# Patient Record
Sex: MALE | Race: WHITE | NOT HISPANIC OR LATINO | Employment: OTHER | URBAN - METROPOLITAN AREA
[De-identification: names, ages, dates, MRNs, and addresses within clinical notes are randomized per-mention and may not be internally consistent; named-entity substitution may affect disease eponyms.]

---

## 2018-04-17 ENCOUNTER — TRANSCRIBE ORDERS (OUTPATIENT)
Dept: ADMINISTRATIVE | Facility: HOSPITAL | Age: 53
End: 2018-04-17

## 2018-04-17 ENCOUNTER — HOSPITAL ENCOUNTER (OUTPATIENT)
Dept: GENERAL RADIOLOGY | Facility: HOSPITAL | Age: 53
Discharge: HOME | End: 2018-04-17
Attending: FAMILY MEDICINE | Admitting: FAMILY MEDICINE

## 2018-04-17 DIAGNOSIS — N20.0 CALCULUS OF KIDNEY: Primary | ICD-10-CM

## 2018-04-17 PROCEDURE — 74018 RADEX ABDOMEN 1 VIEW: CPT

## 2018-10-12 ENCOUNTER — HOSPITAL ENCOUNTER (OUTPATIENT)
Dept: CT IMAGING | Facility: HOSPITAL | Age: 53
Discharge: HOME | End: 2018-10-12
Admitting: FAMILY MEDICINE

## 2018-10-12 ENCOUNTER — TRANSCRIBE ORDERS (OUTPATIENT)
Dept: ADMINISTRATIVE | Facility: HOSPITAL | Age: 53
End: 2018-10-12

## 2018-10-12 DIAGNOSIS — N20.0 KIDNEY STONES: ICD-10-CM

## 2018-10-12 DIAGNOSIS — N20.0 KIDNEY STONES: Primary | ICD-10-CM

## 2018-10-12 PROCEDURE — 74176 CT ABD & PELVIS W/O CONTRAST: CPT

## 2022-04-22 ENCOUNTER — APPOINTMENT (OUTPATIENT)
Dept: GENERAL RADIOLOGY | Facility: HOSPITAL | Age: 57
End: 2022-04-22

## 2022-04-22 ENCOUNTER — HOSPITAL ENCOUNTER (EMERGENCY)
Facility: HOSPITAL | Age: 57
Discharge: HOME | End: 2022-04-22
Attending: EMERGENCY MEDICINE | Admitting: EMERGENCY MEDICINE

## 2022-04-22 VITALS
HEART RATE: 71 BPM | DIASTOLIC BLOOD PRESSURE: 87 MMHG | TEMPERATURE: 98.3 F | BODY MASS INDEX: 24.34 KG/M2 | HEIGHT: 70 IN | OXYGEN SATURATION: 98 % | RESPIRATION RATE: 18 BRPM | WEIGHT: 170 LBS | SYSTOLIC BLOOD PRESSURE: 128 MMHG

## 2022-04-22 DIAGNOSIS — S63.289A DISLOCATION OF PROXIMAL INTERPHALANGEAL JOINT OF FINGER, INITIAL ENCOUNTER: Primary | ICD-10-CM

## 2022-04-22 DIAGNOSIS — S61.217A LACERATION OF LEFT LITTLE FINGER WITHOUT FOREIGN BODY WITHOUT DAMAGE TO NAIL, INITIAL ENCOUNTER: ICD-10-CM

## 2022-04-22 DIAGNOSIS — S62.647B OPEN NONDISPLACED FRACTURE OF PROXIMAL PHALANX OF LEFT LITTLE FINGER, INITIAL ENCOUNTER: ICD-10-CM

## 2022-04-22 PROCEDURE — 73110 X-RAY EXAM OF WRIST: CPT | Performed by: RADIOLOGY

## 2022-04-22 PROCEDURE — 99283 EMERGENCY DEPT VISIT LOW MDM: CPT

## 2022-04-22 PROCEDURE — 73140 X-RAY EXAM OF FINGER(S): CPT

## 2022-04-22 PROCEDURE — 73110 X-RAY EXAM OF WRIST: CPT

## 2022-04-22 PROCEDURE — 90471 IMMUNIZATION ADMIN: CPT | Performed by: PHYSICIAN ASSISTANT

## 2022-04-22 PROCEDURE — 73140 X-RAY EXAM OF FINGER(S): CPT | Performed by: RADIOLOGY

## 2022-04-22 PROCEDURE — 73130 X-RAY EXAM OF HAND: CPT

## 2022-04-22 PROCEDURE — 90715 TDAP VACCINE 7 YRS/> IM: CPT | Performed by: PHYSICIAN ASSISTANT

## 2022-04-22 PROCEDURE — 73130 X-RAY EXAM OF HAND: CPT | Performed by: RADIOLOGY

## 2022-04-22 PROCEDURE — 25010000002 TETANUS-DIPHTH-ACELL PERTUSSIS 5-2.5-18.5 LF-MCG/0.5 SUSPENSION PREFILLED SYRINGE: Performed by: PHYSICIAN ASSISTANT

## 2022-04-22 RX ORDER — CEPHALEXIN 500 MG/1
500 CAPSULE ORAL 4 TIMES DAILY
Qty: 28 CAPSULE | Refills: 0 | Status: SHIPPED | OUTPATIENT
Start: 2022-04-22 | End: 2022-04-29

## 2022-04-22 RX ORDER — LIDOCAINE HYDROCHLORIDE 10 MG/ML
10 INJECTION, SOLUTION EPIDURAL; INFILTRATION; INTRACAUDAL; PERINEURAL ONCE
Status: COMPLETED | OUTPATIENT
Start: 2022-04-22 | End: 2022-04-22

## 2022-04-22 RX ORDER — HYDROCODONE BITARTRATE AND ACETAMINOPHEN 7.5; 325 MG/1; MG/1
1 TABLET ORAL EVERY 6 HOURS PRN
Qty: 8 TABLET | Refills: 0 | Status: SHIPPED | OUTPATIENT
Start: 2022-04-22

## 2022-04-22 RX ORDER — CEPHALEXIN 250 MG/1
500 CAPSULE ORAL ONCE
Status: COMPLETED | OUTPATIENT
Start: 2022-04-22 | End: 2022-04-22

## 2022-04-22 RX ADMIN — TETANUS TOXOID, REDUCED DIPHTHERIA TOXOID AND ACELLULAR PERTUSSIS VACCINE, ADSORBED 0.5 ML: 5; 2.5; 8; 8; 2.5 SUSPENSION INTRAMUSCULAR at 16:14

## 2022-04-22 RX ADMIN — CEPHALEXIN 500 MG: 250 CAPSULE ORAL at 16:13

## 2022-04-22 RX ADMIN — LIDOCAINE HYDROCHLORIDE 10 ML: 10 INJECTION, SOLUTION EPIDURAL; INFILTRATION; INTRACAUDAL; PERINEURAL at 16:13

## 2022-04-22 NOTE — ED NOTES
MEDICAL SCREENING:    Reason for Visit: Left wrist and left fifth finger injury status post rolling over a mower.  Patient does have deformity with a laceration.    Patient initially seen in triage.  The patient was advised further evaluation and diagnostic testing will be needed, some of the treatment and testing will be initiated in the lobby in order to begin the process.  The patient will be returned to the waiting area for the time being and possibly be re-assessed by a subsequent ED provider.  The patient will be brought back to the treatment area in as timely manner as possible.         Tre Rueda PA  04/22/22 1500

## 2022-04-22 NOTE — ED NOTES
Left fifth finger dislocation reduced by OCHOA MORRIS, then irrigated with saline and betadine. Pt tolerated well. Waiting on x-ray prior to closure of laceration on that finger.

## 2022-04-23 NOTE — ED PROVIDER NOTES
Subjective   Patient is a 56-year-old male that presents to the ED following acute injury that occurred just prior to arrival.  He states he was on a 0 turn lawnmower when he got too close to the ditch line and the lawnmower turned over.  He states that he is not sure exactly how he injured the finger in the fall but states that once he got up he noticed a cut along his finger with a gross deformity.  He states he was only able to move it minimally.  He denies hitting his head or loss of consciousness.  He denies chest pain, shortness of breath, fever, chills, nausea, vomiting, headache, dizziness, abdominal pain, dysuria, diarrhea, or constipation.      History provided by:  Patient   used: No        Review of Systems   Constitutional: Negative.  Negative for chills, diaphoresis, fatigue and fever.   HENT: Negative.  Negative for congestion, drooling, ear discharge, ear pain, facial swelling, postnasal drip, rhinorrhea, sinus pressure, sinus pain, sneezing, sore throat, tinnitus and trouble swallowing.    Eyes: Negative.  Negative for photophobia, pain, discharge, redness and itching.   Respiratory: Negative.  Negative for cough, shortness of breath and wheezing.    Cardiovascular: Negative.  Negative for chest pain.   Gastrointestinal: Negative.  Negative for abdominal distention, abdominal pain, blood in stool, constipation, diarrhea, nausea and vomiting.   Endocrine: Negative.    Genitourinary: Negative.  Negative for difficulty urinating, dysuria, flank pain and frequency.   Musculoskeletal: Positive for arthralgias and joint swelling. Negative for back pain, gait problem, myalgias, neck pain and neck stiffness.   Skin: Negative.    Neurological: Negative.  Negative for dizziness, tremors, seizures, syncope, facial asymmetry, speech difficulty, weakness, light-headedness, numbness and headaches.   Psychiatric/Behavioral: Negative.  Negative for confusion.   All other systems reviewed and are  negative.      No past medical history on file.    Allergies   Allergen Reactions   • Milk-Related Compounds GI Intolerance       No past surgical history on file.    No family history on file.    Social History     Socioeconomic History   • Marital status:            Objective   Physical Exam  Vitals and nursing note reviewed.   Constitutional:       General: He is not in acute distress.     Appearance: He is well-developed. He is not diaphoretic.   HENT:      Head: Normocephalic and atraumatic.      Right Ear: External ear normal.      Left Ear: External ear normal.      Nose: Nose normal.      Mouth/Throat:      Mouth: Mucous membranes are moist.      Pharynx: Oropharynx is clear.   Eyes:      Extraocular Movements: Extraocular movements intact.      Conjunctiva/sclera: Conjunctivae normal.      Pupils: Pupils are equal, round, and reactive to light.   Neck:      Vascular: No JVD.      Trachea: No tracheal deviation.   Cardiovascular:      Rate and Rhythm: Normal rate and regular rhythm.      Pulses: Normal pulses.      Heart sounds: Normal heart sounds. No murmur heard.  Pulmonary:      Effort: Pulmonary effort is normal. No respiratory distress.      Breath sounds: Normal breath sounds. No wheezing.   Abdominal:      General: Bowel sounds are normal. There is no distension.      Palpations: Abdomen is soft.      Tenderness: There is no abdominal tenderness. There is no guarding or rebound.   Musculoskeletal:         General: No deformity. Normal range of motion.      Cervical back: Normal range of motion and neck supple.      Comments: Nation of the left index finger reveals a 2 cm laceration just proximal to the PIP joint on the palmar surface.  There is exposed bone.  There is dislocation of the PIP joint.  There is gross deformity.  His neurovascular status is intact.   Skin:     General: Skin is warm and dry.      Coloration: Skin is not pale.      Findings: No erythema or rash.   Neurological:       General: No focal deficit present.      Mental Status: He is alert and oriented to person, place, and time.      Cranial Nerves: No cranial nerve deficit.   Psychiatric:         Mood and Affect: Mood normal.         Behavior: Behavior normal.         Thought Content: Thought content normal.         Laceration Repair    Date/Time: 4/22/2022 10:00 PM  Performed by: Myranda Beebe PA-C  Authorized by: Mau Zendejas MD     Consent:     Consent obtained:  Verbal    Consent given by:  Patient    Risks, benefits, and alternatives were discussed: yes      Risks discussed:  Infection, need for additional repair, nerve damage, pain, poor cosmetic result, poor wound healing, retained foreign body, tendon damage and vascular damage    Alternatives discussed:  No treatment  Universal protocol:     Patient identity confirmed:  Verbally with patient and arm band  Anesthesia:     Anesthesia method:  Nerve block    Block location:  Digital block    Block needle gauge:  25 G    Block anesthetic:  Lidocaine 1% w/o epi    Block injection procedure:  Anatomic landmarks identified    Block outcome:  Anesthesia achieved  Laceration details:     Length (cm):  2    Depth (mm):  2  Pre-procedure details:     Preparation:  Patient was prepped and draped in usual sterile fashion  Exploration:     Hemostasis achieved with:  Direct pressure    Imaging obtained: x-ray      Imaging outcome: foreign body not noted      Wound extent: fascia violated and underlying fracture      Wound extent: no foreign bodies/material noted, no muscle damage noted, no nerve damage noted, no tendon damage noted and no vascular damage noted    Treatment:     Irrigation method:  Syringe    Debridement:  None  Skin repair:     Repair method:  Sutures    Suture size:  4-0    Suture material:  Nylon    Suture technique:  Simple interrupted    Number of sutures:  5  Approximation:     Approximation:  Close  Repair type:     Repair type:  Intermediate  Post-procedure  details:     Dressing:  Non-adherent dressing and splint for protection    Procedure completion:  Tolerated well, no immediate complications  FX Dislocation    Date/Time: 4/22/2022 10:02 PM  Performed by: Myranda Beebe PA-C  Authorized by: Mau Zendejas MD     Consent:     Consent obtained:  Verbal    Consent given by:  Patient    Risks, benefits, and alternatives were discussed: yes      Risks discussed:  Nerve damage, pain and vascular damage    Alternatives discussed:  No treatment  Universal protocol:     Patient identity confirmed:  Verbally with patient and arm band  Injury:     Injury location:  Finger    Finger injury location:  L index finger    Finger fracture type: proximal phalanx fracture      MCP joint involved: no      IP joint involved: yes    Pre-procedure details:     Distal neurologic exam:  Normal    Distal perfusion: distal pulses strong      Range of motion: reduced    Sedation:     Sedation type:  None  Anesthesia:     Anesthesia method:  Nerve block    Block location:  Digital block    Block anesthetic:  Lidocaine 1% w/o epi    Block injection procedure:  Anatomic landmarks identified    Block outcome:  Anesthesia achieved  Procedure details:     Skeletal traction used: yes      Pin inserted: yes      X-ray confirmed reduction: yes                 ED Course  ED Course as of 04/22/22 2209 Fri Apr 22, 2022   0193 Discussed with Dr. Gann.  We reviewed x-rays and he recommends resetting the dislocation.  He then would like the wound to be irrigated well.  He states that the laceration can then be repaired and follow-up with him in the clinic. [MH]      ED Course User Index  [MH] Myranda Beebe PA-C                                                 [facility]    Final diagnoses:   Dislocation of proximal interphalangeal joint of finger, initial encounter   Laceration of left little finger without foreign body without damage to nail, initial encounter   Open nondisplaced fracture of  proximal phalanx of left little finger, initial encounter       ED Disposition  ED Disposition     ED Disposition   Discharge    Condition   Stable    Comment   --             Justin Gann, DO  [address] [place] [id number] [phone]    Schedule an appointment as soon as possible for a visit in 1 day      Leigh Isaacs, VITOR  [address] [facility] [id number] [phone]    Schedule an appointment as soon as possible for a visit in 1 day           Medication List      New Prescriptions    cephalexin 500 MG capsule  Commonly known as: KEFLEX  Take 1 capsule by mouth 4 (Four) Times a Day for 7 days.     HYDROcodone-acetaminophen 7.5-325 MG per tablet  Commonly known as: NORCO  Take 1 tablet by mouth Every 6 (Six) Hours As Needed for Moderate Pain .           Where to Get Your Medications      These medications were sent to [id number] - [place], [place] - [address] [facility] [phone] [facility] [phone]   [address] [address]    Phone: [phone]   · HYDROcodone-acetaminophen 7.5-325 MG per tablet     You can get these medications from any pharmacy    Bring a paper prescription for each of these medications  · cephalexin 500 MG capsule          Myranda Beebe PA-C  04/22/22 7519

## 2022-11-21 ENCOUNTER — OFFICE VISIT (OUTPATIENT)
Dept: SURGERY | Facility: CLINIC | Age: 57
End: 2022-11-21

## 2022-11-21 VITALS
HEIGHT: 71 IN | SYSTOLIC BLOOD PRESSURE: 112 MMHG | WEIGHT: 175 LBS | DIASTOLIC BLOOD PRESSURE: 70 MMHG | BODY MASS INDEX: 24.5 KG/M2

## 2022-11-21 DIAGNOSIS — K43.2 INCISIONAL HERNIA, WITHOUT OBSTRUCTION OR GANGRENE: Primary | ICD-10-CM

## 2022-11-21 PROCEDURE — 99203 OFFICE O/P NEW LOW 30 MIN: CPT | Performed by: SURGERY

## 2022-11-21 RX ORDER — RIZATRIPTAN BENZOATE 10 MG/1
TABLET ORAL
COMMUNITY
Start: 2022-11-19

## 2022-11-21 NOTE — PROGRESS NOTES
Subjective   Jhon Morocho is a 57 y.o. male.     Chief Complaint: incisional hernia    History of Present Illness He is a 56 yo who had a benign tumor near his colon removed over 15 years ago with no sign of recurrence. His last colonoscopy was years ago and was normal. He ad a CT about 8 years ago that was julia. He has had small lumps in the old scar that are tender and come and go. No recent scans or GI issues.     The following portions of the patient's history were reviewed and updated as appropriate: current medications, past family history, past medical history, past social history, past surgical history and problem list.    Review of Systems   Constitutional: Negative for activity change, appetite change, chills, fever and unexpected weight change.   HENT: Negative for congestion, facial swelling and sore throat.    Eyes: Negative for photophobia and visual disturbance.   Respiratory: Negative for chest tightness, shortness of breath and wheezing.    Cardiovascular: Negative for chest pain, palpitations and leg swelling.   Gastrointestinal: Positive for abdominal pain. Negative for abdominal distention, anal bleeding, blood in stool, constipation, diarrhea, nausea, rectal pain and vomiting.   Endocrine: Negative for cold intolerance, heat intolerance, polydipsia and polyuria.   Genitourinary: Negative for difficulty urinating, dysuria, flank pain and urgency.   Musculoskeletal: Negative for back pain and myalgias.   Skin: Negative for rash and wound.   Allergic/Immunologic: Negative for immunocompromised state.   Neurological: Negative for dizziness, seizures, syncope, light-headedness, numbness and headaches.   Hematological: Negative for adenopathy. Does not bruise/bleed easily.   Psychiatric/Behavioral: Negative for behavioral problems and confusion. The patient is not nervous/anxious.        Objective   Physical Exam  Vitals reviewed.   Constitutional:       General: He is not in acute distress.      Appearance: He is well-developed. He is not ill-appearing.       HENT:      Head: Normocephalic. No laceration. Hair is normal.      Right Ear: Hearing and ear canal normal.      Left Ear: Hearing and ear canal normal.      Nose: Nose normal.      Right Sinus: No maxillary sinus tenderness or frontal sinus tenderness.      Left Sinus: No maxillary sinus tenderness or frontal sinus tenderness.   Eyes:      General: Lids are normal.      Conjunctiva/sclera: Conjunctivae normal.      Pupils: Pupils are equal, round, and reactive to light.   Neck:      Thyroid: No thyroid mass or thyromegaly.      Vascular: No JVD.      Trachea: No tracheal tenderness or tracheal deviation.   Cardiovascular:      Rate and Rhythm: Normal rate and regular rhythm.      Heart sounds: No murmur heard.    No gallop.   Pulmonary:      Effort: Pulmonary effort is normal.      Breath sounds: Normal breath sounds. No stridor. No wheezing.   Chest:      Chest wall: No tenderness.   Abdominal:      General: Bowel sounds are normal. There is no distension.      Palpations: Abdomen is soft. There is no mass.      Tenderness: There is abdominal tenderness. There is no guarding or rebound.      Hernia: A hernia is present.   Musculoskeletal:         General: No deformity.      Cervical back: Normal range of motion.   Lymphadenopathy:      Cervical: No cervical adenopathy.      Upper Body:      Right upper body: No supraclavicular adenopathy.      Left upper body: No supraclavicular adenopathy.   Skin:     General: Skin is warm and dry.      Coloration: Skin is not pale.      Findings: No erythema or rash.   Neurological:      Mental Status: He is alert and oriented to person, place, and time.      Motor: No abnormal muscle tone.   Psychiatric:         Behavior: Behavior normal.         Thought Content: Thought content normal.         History reviewed. No pertinent past medical history.    History reviewed. No pertinent family history.    Social History      Tobacco Use   • Smoking status: Never   • Smokeless tobacco: Never   Vaping Use   • Vaping Use: Never used   Substance Use Topics   • Alcohol use: Never   • Drug use: Never       Past Surgical History:   Procedure Laterality Date   • COLON SURGERY     • COLONOSCOPY     • KIDNEY STONE SURGERY     • VASECTOMY         Current Outpatient Medications   Medication Instructions   • HYDROcodone-acetaminophen (NORCO) 7.5-325 MG per tablet 1 tablet, Oral, Every 6 Hours PRN   • rizatriptan (MAXALT) 10 MG tablet TAKE 1 TABLET BY MOUTH MAX DOSE PER DAY 30MG/24HRS         Assessment & Plan   Diagnoses and all orders for this visit:    1. Incisional hernia, without obstruction or gangrene (Primary)    He will get a CT first and then repair the hernia

## 2022-11-28 ENCOUNTER — TELEPHONE (OUTPATIENT)
Dept: SURGERY | Facility: CLINIC | Age: 57
End: 2022-11-28

## 2022-11-30 ENCOUNTER — HOSPITAL ENCOUNTER (OUTPATIENT)
Dept: CT IMAGING | Facility: HOSPITAL | Age: 57
Discharge: HOME | End: 2022-11-30
Admitting: SURGERY

## 2022-11-30 DIAGNOSIS — K43.2 INCISIONAL HERNIA, WITHOUT OBSTRUCTION OR GANGRENE: ICD-10-CM

## 2022-11-30 LAB — CREAT BLDA-MCNC: 1.4 MG/DL (ref 0.6–1.3)

## 2022-11-30 PROCEDURE — 74177 CT ABD & PELVIS W/CONTRAST: CPT | Performed by: RADIOLOGY

## 2022-11-30 PROCEDURE — 82565 ASSAY OF CREATININE: CPT

## 2022-11-30 PROCEDURE — 25010000002 IOPAMIDOL 61 % SOLUTION: Performed by: SURGERY

## 2022-11-30 PROCEDURE — 74177 CT ABD & PELVIS W/CONTRAST: CPT

## 2022-11-30 RX ADMIN — IOPAMIDOL 80 ML: 612 INJECTION, SOLUTION INTRAVENOUS at 13:26

## 2022-12-02 ENCOUNTER — TELEPHONE (OUTPATIENT)
Dept: SURGERY | Facility: CLINIC | Age: 57
End: 2022-12-02

## 2022-12-06 ENCOUNTER — PRE-ADMISSION TESTING (OUTPATIENT)
Dept: PREADMISSION TESTING | Facility: HOSPITAL | Age: 57
End: 2022-12-06

## 2022-12-06 NOTE — DISCHARGE INSTRUCTIONS

## 2022-12-08 ENCOUNTER — ANESTHESIA (OUTPATIENT)
Dept: PERIOP | Facility: HOSPITAL | Age: 57
End: 2022-12-08

## 2022-12-08 ENCOUNTER — HOSPITAL ENCOUNTER (OUTPATIENT)
Facility: HOSPITAL | Age: 57
Setting detail: HOSPITAL OUTPATIENT SURGERY
Discharge: HOME | End: 2022-12-08
Attending: SURGERY | Admitting: SURGERY

## 2022-12-08 ENCOUNTER — ANESTHESIA EVENT (OUTPATIENT)
Dept: PERIOP | Facility: HOSPITAL | Age: 57
End: 2022-12-08

## 2022-12-08 VITALS
HEART RATE: 79 BPM | OXYGEN SATURATION: 98 % | BODY MASS INDEX: 24.5 KG/M2 | RESPIRATION RATE: 16 BRPM | SYSTOLIC BLOOD PRESSURE: 121 MMHG | HEIGHT: 71 IN | WEIGHT: 175 LBS | TEMPERATURE: 97.5 F | DIASTOLIC BLOOD PRESSURE: 79 MMHG

## 2022-12-08 DIAGNOSIS — K43.2 INCISIONAL HERNIA, WITHOUT OBSTRUCTION OR GANGRENE: Primary | ICD-10-CM

## 2022-12-08 PROCEDURE — 25010000002 PROPOFOL 10 MG/ML EMULSION: Performed by: NURSE ANESTHETIST, CERTIFIED REGISTERED

## 2022-12-08 PROCEDURE — 25010000002 FENTANYL CITRATE (PF) 50 MCG/ML SOLUTION: Performed by: NURSE ANESTHETIST, CERTIFIED REGISTERED

## 2022-12-08 PROCEDURE — C1781 MESH (IMPLANTABLE): HCPCS | Performed by: SURGERY

## 2022-12-08 PROCEDURE — 49560 PR REPAIR INCISIONAL HERNIA,REDUCIBLE: CPT | Performed by: SURGERY

## 2022-12-08 PROCEDURE — 25010000002 ONDANSETRON PER 1 MG: Performed by: NURSE ANESTHETIST, CERTIFIED REGISTERED

## 2022-12-08 PROCEDURE — 0 MEPERIDINE PER 100 MG: Performed by: NURSE ANESTHETIST, CERTIFIED REGISTERED

## 2022-12-08 PROCEDURE — 25010000002 NEOSTIGMINE 10 MG/10ML SOLUTION: Performed by: NURSE ANESTHETIST, CERTIFIED REGISTERED

## 2022-12-08 PROCEDURE — 25010000002 MIDAZOLAM PER 1 MG: Performed by: NURSE ANESTHETIST, CERTIFIED REGISTERED

## 2022-12-08 DEVICE — OPTIFIX AT ABSORBABLE FIXATION SYSTEM WITH ARTICULATING TECHNOLOGY - 30 ABSORBABLE FASTENERS
Type: IMPLANTABLE DEVICE | Site: ABDOMEN | Status: FUNCTIONAL
Brand: OPTIFIX AT ABSORBABLE FIXATION SYSTEM WITH ARTICULATING TECHNOLOGY

## 2022-12-08 DEVICE — VENTRIO ST HERNIA PATCH
Type: IMPLANTABLE DEVICE | Site: ABDOMEN | Status: FUNCTIONAL
Brand: VENTRIO ST HERNIA PATCH

## 2022-12-08 RX ORDER — SODIUM CHLORIDE 9 MG/ML
40 INJECTION, SOLUTION INTRAVENOUS AS NEEDED
Status: DISCONTINUED | OUTPATIENT
Start: 2022-12-08 | End: 2022-12-08 | Stop reason: HOSPADM

## 2022-12-08 RX ORDER — EPHEDRINE SULFATE 5 MG/ML
INJECTION INTRAVENOUS AS NEEDED
Status: DISCONTINUED | OUTPATIENT
Start: 2022-12-08 | End: 2022-12-08 | Stop reason: SURG

## 2022-12-08 RX ORDER — MAGNESIUM HYDROXIDE 1200 MG/15ML
LIQUID ORAL AS NEEDED
Status: DISCONTINUED | OUTPATIENT
Start: 2022-12-08 | End: 2022-12-08 | Stop reason: HOSPADM

## 2022-12-08 RX ORDER — SODIUM CHLORIDE 0.9 % (FLUSH) 0.9 %
10 SYRINGE (ML) INJECTION EVERY 12 HOURS SCHEDULED
Status: DISCONTINUED | OUTPATIENT
Start: 2022-12-08 | End: 2022-12-08 | Stop reason: HOSPADM

## 2022-12-08 RX ORDER — NEOSTIGMINE METHYLSULFATE 1 MG/ML
INJECTION, SOLUTION INTRAVENOUS AS NEEDED
Status: DISCONTINUED | OUTPATIENT
Start: 2022-12-08 | End: 2022-12-08 | Stop reason: SURG

## 2022-12-08 RX ORDER — ONDANSETRON 2 MG/ML
4 INJECTION INTRAMUSCULAR; INTRAVENOUS AS NEEDED
Status: DISCONTINUED | OUTPATIENT
Start: 2022-12-08 | End: 2022-12-08 | Stop reason: HOSPADM

## 2022-12-08 RX ORDER — ROCURONIUM BROMIDE 10 MG/ML
INJECTION, SOLUTION INTRAVENOUS AS NEEDED
Status: DISCONTINUED | OUTPATIENT
Start: 2022-12-08 | End: 2022-12-08 | Stop reason: SURG

## 2022-12-08 RX ORDER — IPRATROPIUM BROMIDE AND ALBUTEROL SULFATE 2.5; .5 MG/3ML; MG/3ML
3 SOLUTION RESPIRATORY (INHALATION) ONCE AS NEEDED
Status: DISCONTINUED | OUTPATIENT
Start: 2022-12-08 | End: 2022-12-08 | Stop reason: HOSPADM

## 2022-12-08 RX ORDER — ONDANSETRON 2 MG/ML
INJECTION INTRAMUSCULAR; INTRAVENOUS AS NEEDED
Status: DISCONTINUED | OUTPATIENT
Start: 2022-12-08 | End: 2022-12-08 | Stop reason: SURG

## 2022-12-08 RX ORDER — GLYCOPYRROLATE 0.2 MG/ML
INJECTION INTRAMUSCULAR; INTRAVENOUS AS NEEDED
Status: DISCONTINUED | OUTPATIENT
Start: 2022-12-08 | End: 2022-12-08 | Stop reason: SURG

## 2022-12-08 RX ORDER — HYDROCODONE BITARTRATE AND ACETAMINOPHEN 7.5; 325 MG/1; MG/1
1 TABLET ORAL EVERY 4 HOURS PRN
Status: DISCONTINUED | OUTPATIENT
Start: 2022-12-08 | End: 2022-12-08 | Stop reason: HOSPADM

## 2022-12-08 RX ORDER — PROPOFOL 10 MG/ML
VIAL (ML) INTRAVENOUS AS NEEDED
Status: DISCONTINUED | OUTPATIENT
Start: 2022-12-08 | End: 2022-12-08 | Stop reason: SURG

## 2022-12-08 RX ORDER — SODIUM CHLORIDE, SODIUM LACTATE, POTASSIUM CHLORIDE, CALCIUM CHLORIDE 600; 310; 30; 20 MG/100ML; MG/100ML; MG/100ML; MG/100ML
100 INJECTION, SOLUTION INTRAVENOUS ONCE AS NEEDED
Status: DISCONTINUED | OUTPATIENT
Start: 2022-12-08 | End: 2022-12-08 | Stop reason: HOSPADM

## 2022-12-08 RX ORDER — FENTANYL CITRATE 50 UG/ML
50 INJECTION, SOLUTION INTRAMUSCULAR; INTRAVENOUS
Status: DISCONTINUED | OUTPATIENT
Start: 2022-12-08 | End: 2022-12-08 | Stop reason: HOSPADM

## 2022-12-08 RX ORDER — MIDAZOLAM HYDROCHLORIDE 1 MG/ML
1 INJECTION INTRAMUSCULAR; INTRAVENOUS
Status: DISCONTINUED | OUTPATIENT
Start: 2022-12-08 | End: 2022-12-08 | Stop reason: HOSPADM

## 2022-12-08 RX ORDER — FENTANYL CITRATE 50 UG/ML
INJECTION, SOLUTION INTRAMUSCULAR; INTRAVENOUS AS NEEDED
Status: DISCONTINUED | OUTPATIENT
Start: 2022-12-08 | End: 2022-12-08 | Stop reason: SURG

## 2022-12-08 RX ORDER — MIDAZOLAM HYDROCHLORIDE 1 MG/ML
INJECTION INTRAMUSCULAR; INTRAVENOUS AS NEEDED
Status: DISCONTINUED | OUTPATIENT
Start: 2022-12-08 | End: 2022-12-08 | Stop reason: SURG

## 2022-12-08 RX ORDER — OXYCODONE HYDROCHLORIDE AND ACETAMINOPHEN 5; 325 MG/1; MG/1
1 TABLET ORAL ONCE AS NEEDED
Status: DISCONTINUED | OUTPATIENT
Start: 2022-12-08 | End: 2022-12-08 | Stop reason: HOSPADM

## 2022-12-08 RX ORDER — SODIUM CHLORIDE 0.9 % (FLUSH) 0.9 %
10 SYRINGE (ML) INJECTION AS NEEDED
Status: DISCONTINUED | OUTPATIENT
Start: 2022-12-08 | End: 2022-12-08 | Stop reason: HOSPADM

## 2022-12-08 RX ORDER — FAMOTIDINE 10 MG/ML
INJECTION, SOLUTION INTRAVENOUS AS NEEDED
Status: DISCONTINUED | OUTPATIENT
Start: 2022-12-08 | End: 2022-12-08 | Stop reason: SURG

## 2022-12-08 RX ORDER — BUPIVACAINE HYDROCHLORIDE AND EPINEPHRINE 5; 5 MG/ML; UG/ML
INJECTION, SOLUTION PERINEURAL AS NEEDED
Status: DISCONTINUED | OUTPATIENT
Start: 2022-12-08 | End: 2022-12-08 | Stop reason: HOSPADM

## 2022-12-08 RX ORDER — HYDROCODONE BITARTRATE AND ACETAMINOPHEN 7.5; 325 MG/1; MG/1
1 TABLET ORAL EVERY 4 HOURS PRN
Qty: 15 TABLET | Refills: 0 | Status: SHIPPED | OUTPATIENT
Start: 2022-12-08 | End: 2022-12-18

## 2022-12-08 RX ORDER — SODIUM CHLORIDE, SODIUM LACTATE, POTASSIUM CHLORIDE, CALCIUM CHLORIDE 600; 310; 30; 20 MG/100ML; MG/100ML; MG/100ML; MG/100ML
125 INJECTION, SOLUTION INTRAVENOUS ONCE
Status: COMPLETED | OUTPATIENT
Start: 2022-12-08 | End: 2022-12-08

## 2022-12-08 RX ORDER — MEPERIDINE HYDROCHLORIDE 25 MG/ML
12.5 INJECTION INTRAMUSCULAR; INTRAVENOUS; SUBCUTANEOUS
Status: COMPLETED | OUTPATIENT
Start: 2022-12-08 | End: 2022-12-08

## 2022-12-08 RX ADMIN — FAMOTIDINE 20 MG: 10 INJECTION, SOLUTION INTRAVENOUS at 10:28

## 2022-12-08 RX ADMIN — MEPERIDINE HYDROCHLORIDE 12.5 MG: 25 INJECTION INTRAMUSCULAR; INTRAVENOUS; SUBCUTANEOUS at 11:36

## 2022-12-08 RX ADMIN — SODIUM CHLORIDE, POTASSIUM CHLORIDE, SODIUM LACTATE AND CALCIUM CHLORIDE: 600; 310; 30; 20 INJECTION, SOLUTION INTRAVENOUS at 10:00

## 2022-12-08 RX ADMIN — ONDANSETRON 4 MG: 2 INJECTION INTRAMUSCULAR; INTRAVENOUS at 10:28

## 2022-12-08 RX ADMIN — GLYCOPYRROLATE 0.4 MG: 0.4 INJECTION INTRAMUSCULAR; INTRAVENOUS at 10:24

## 2022-12-08 RX ADMIN — ROCURONIUM BROMIDE 25 MG: 10 INJECTION, SOLUTION INTRAVENOUS at 10:32

## 2022-12-08 RX ADMIN — ONDANSETRON 4 MG: 2 INJECTION INTRAMUSCULAR; INTRAVENOUS at 11:36

## 2022-12-08 RX ADMIN — EPHEDRINE SULFATE 10 MG: 5 INJECTION INTRAVENOUS at 10:50

## 2022-12-08 RX ADMIN — MEPERIDINE HYDROCHLORIDE 12.5 MG: 25 INJECTION INTRAMUSCULAR; INTRAVENOUS; SUBCUTANEOUS at 11:48

## 2022-12-08 RX ADMIN — MIDAZOLAM 2 MG: 1 INJECTION INTRAMUSCULAR; INTRAVENOUS at 10:28

## 2022-12-08 RX ADMIN — FENTANYL CITRATE 50 MCG: 50 INJECTION, SOLUTION INTRAMUSCULAR; INTRAVENOUS at 11:36

## 2022-12-08 RX ADMIN — NEOSTIGMINE METHYLSULFATE 3 MG: 0.5 INJECTION INTRAVENOUS at 10:24

## 2022-12-08 RX ADMIN — PROPOFOL 150 MG: 10 INJECTION, EMULSION INTRAVENOUS at 10:32

## 2022-12-08 RX ADMIN — FENTANYL CITRATE 100 MCG: 50 INJECTION, SOLUTION INTRAMUSCULAR; INTRAVENOUS at 10:28

## 2022-12-08 RX ADMIN — OXYCODONE HYDROCHLORIDE AND ACETAMINOPHEN 1 TABLET: 5; 325 TABLET ORAL at 11:54

## 2022-12-08 NOTE — ANESTHESIA PROCEDURE NOTES
Airway  Urgency: elective    Date/Time: 12/8/2022 10:32 AM  End Time:12/8/2022 10:32 AM  Airway not difficult    General Information and Staff    Patient location during procedure: OR  CRNA/CAA: Darian Velasco CRNA    Indications and Patient Condition  Indications for airway management: airway protection    Preoxygenated: yes  MILS maintained throughout  Mask difficulty assessment: 0 - not attempted    Final Airway Details  Final airway type: endotracheal airway      Successful airway: ETT  Cuffed: yes   Successful intubation technique: direct laryngoscopy  Endotracheal tube insertion site: oral  Blade: Angie  Blade size: 4  ETT size (mm): 7.5  Cormack-Lehane Classification: grade IIa - partial view of glottis  Placement verified by: chest auscultation, capnometry and palpation of cuff   Cuff volume (mL): 8  Measured from: lips  ETT/EBT  to lips (cm): 22  Number of attempts at approach: 1  Assessment: lips, teeth, and gum same as pre-op and atraumatic intubation

## 2022-12-08 NOTE — OP NOTE
VENTRAL/INCISIONAL HERNIA REPAIR  Procedure Note    Jhon MÓNICA Bailee  12/8/2022    Pre-op Diagnosis:   Incisional hernia, without obstruction or gangrene [K43.2]    Post-op Diagnosis: same         Procedure(s):  VENTRAL/INCISIONAL HERNIA REPAIR    Surgeon(s):  Ellis Aguilera MD    Anesthesia: Anesthesia type not filed in the log.    Staff:   Circulator: Lyssa Rojo RN  Scrub Person: Blanca Scott  Assistant: Ellis Armijo    Estimated Blood Loss: minimal    Specimens:                * No orders in the log *      Drains: * No LDAs found *    Procedure: The abdomen was prepped and draped. The old scar was partially excised and the fascia opened with cautery. There were several small separate fascial defects along the scar. The omentum was freed from the abdominal wall and a large oval Bard patch placed in the abdomen. It was tacked to the fascia with tacks and then the fascia was closed in the midline closing all the small defects using vicryl sutures. The subcutaneous tissues were irrigated out. The subcutaneous tissue was closed with vicryl and skin closed with staples.     Findings: incisional hernia         Complications: none   Grafts / Implants N/A    Ellis Aguilera MD     Date: 12/8/2022  Time: 11:12 EST

## 2022-12-08 NOTE — ANESTHESIA PREPROCEDURE EVALUATION
Anesthesia Evaluation     no history of anesthetic complications:  NPO Solid Status: > 8 hours  NPO Liquid Status: > 8 hours           Airway   Mallampati: II  TM distance: >3 FB  Neck ROM: full  No difficulty expected  Dental - normal exam     Pulmonary - normal exam   Cardiovascular - normal exam        Neuro/Psych  GI/Hepatic/Renal/Endo    (+)  GERD,  renal disease,     Musculoskeletal     Abdominal  - normal exam   Substance History      OB/GYN          Other                        Anesthesia Plan    ASA 2     general     intravenous induction     Anesthetic plan, risks, benefits, and alternatives have been provided, discussed and informed consent has been obtained with: patient.        CODE STATUS:

## 2022-12-08 NOTE — ANESTHESIA POSTPROCEDURE EVALUATION
Patient: Jhon Morocho    Procedure Summary     Date: 12/08/22 Room / Location: [facility] OR  /  [facility] OR    Anesthesia Start: 1028 Anesthesia Stop: 1123    Procedure: VENTRAL/INCISIONAL HERNIA REPAIR (Abdomen) Diagnosis:       Incisional hernia, without obstruction or gangrene      (Incisional hernia, without obstruction or gangrene [K43.2])    Surgeons: Ellis Aguilera MD Provider: Montez Olea MD    Anesthesia Type: general ASA Status: 2          Anesthesia Type: general    Vitals  Vitals Value Taken Time   /73 12/08/22 1124   Temp 97.2 °F (36.2 °C) 12/08/22 1124   Pulse 66 12/08/22 1124   Resp 16 12/08/22 1124   SpO2 98 % 12/08/22 1124           Post Anesthesia Care and Evaluation    Patient location during evaluation: PHASE II  Patient participation: complete - patient participated  Level of consciousness: awake and alert  Pain score: 0  Pain management: adequate    Airway patency: patent  Anesthetic complications: No anesthetic complications    Cardiovascular status: acceptable  Respiratory status: acceptable  Hydration status: acceptable

## 2022-12-15 ENCOUNTER — OFFICE VISIT (OUTPATIENT)
Dept: SURGERY | Facility: CLINIC | Age: 57
End: 2022-12-15

## 2022-12-15 VITALS — HEIGHT: 71 IN | BODY MASS INDEX: 24.5 KG/M2 | WEIGHT: 175 LBS

## 2022-12-15 DIAGNOSIS — Z87.19 STATUS POST REPAIR OF VENTRAL HERNIA: Primary | ICD-10-CM

## 2022-12-15 DIAGNOSIS — Z98.890 STATUS POST REPAIR OF VENTRAL HERNIA: Primary | ICD-10-CM

## 2022-12-15 PROCEDURE — 99024 POSTOP FOLLOW-UP VISIT: CPT | Performed by: SURGERY

## 2022-12-15 NOTE — PROGRESS NOTES
Subjective   Jhon Morocho is a 57 y.o. male.     Chief Complaint: post ventral hernia repair    History of Present Illness He is a 58 yo who has had a ventral hernia repair last week. He has been rather sore but wound is ok.    The following portions of the patient's history were reviewed and updated as appropriate: current medications, past family history, past medical history, past social history, past surgical history and problem list.    Review of Systems    Objective   Physical Exam wound ok, normal swelling and induration, just sore. Staples removed.    Past Medical History:   Diagnosis Date   • Arthritis    • Benign tumor of colon    • GERD (gastroesophageal reflux disease)     no problems for 6 years - diet change   • Kidney stones        History reviewed. No pertinent family history.    Social History     Tobacco Use   • Smoking status: Never   • Smokeless tobacco: Never   Vaping Use   • Vaping Use: Never used   Substance Use Topics   • Alcohol use: Not Currently     Comment: social only   • Drug use: Never       Past Surgical History:   Procedure Laterality Date   • ABDOMINAL SURGERY     • COLON SURGERY     • COLONOSCOPY     • KIDNEY STONE SURGERY     • KIDNEY STONE SURGERY     • MOUTH SURGERY      2 teeth implants   • SEPTOPLASTY     • VASECTOMY     • VENTRAL/INCISIONAL HERNIA REPAIR N/A 12/8/2022    Procedure: VENTRAL/INCISIONAL HERNIA REPAIR;  Surgeon: Ellis Aguilera MD;  Location: [facility];  Service: General;  Laterality: N/A;       Current Outpatient Medications   Medication Instructions   • HYDROcodone-acetaminophen (NORCO) 7.5-325 MG per tablet 1 tablet, Oral, Every 6 Hours PRN   • HYDROcodone-acetaminophen (NORCO) 7.5-325 MG per tablet 1 tablet, Oral, Every 4 Hours PRN   • rizatriptan (MAXALT) 10 MG tablet prn         Assessment & Plan   Diagnoses and all orders for this visit:    1. Status post repair of ventral hernia (Primary)    return 1 mo

## 2022-12-16 ENCOUNTER — APPOINTMENT (OUTPATIENT)
Dept: CT IMAGING | Facility: HOSPITAL | Age: 57
End: 2022-12-16

## 2023-01-12 ENCOUNTER — OFFICE VISIT (OUTPATIENT)
Dept: SURGERY | Facility: CLINIC | Age: 58
End: 2023-01-12
Payer: COMMERCIAL

## 2023-01-12 VITALS — BODY MASS INDEX: 24.5 KG/M2 | WEIGHT: 175 LBS | HEIGHT: 71 IN

## 2023-01-12 DIAGNOSIS — Z87.19 STATUS POST REPAIR OF VENTRAL HERNIA: Primary | ICD-10-CM

## 2023-01-12 DIAGNOSIS — Z98.890 STATUS POST REPAIR OF VENTRAL HERNIA: Primary | ICD-10-CM

## 2023-01-12 PROCEDURE — 99024 POSTOP FOLLOW-UP VISIT: CPT | Performed by: SURGERY

## 2023-01-12 NOTE — PROGRESS NOTES
Subjective   Jhon Morocho is a 57 y.o. male.     Chief Complaint: post ventral hernia repair    History of Present Illness He is doing well post ventral hernia repair.    The following portions of the patient's history were reviewed and updated as appropriate: current medications, past family history, past medical history, past social history, past surgical history and problem list.    Review of Systems    Objective   Physical Exam wound ok, no sign of recurrence.    Past Medical History:   Diagnosis Date   • Arthritis    • Benign tumor of colon    • GERD (gastroesophageal reflux disease)     no problems for 6 years - diet change   • Kidney stones        No family history on file.    Social History     Tobacco Use   • Smoking status: Never   • Smokeless tobacco: Never   Vaping Use   • Vaping Use: Never used   Substance Use Topics   • Alcohol use: Not Currently     Comment: social only   • Drug use: Never       Past Surgical History:   Procedure Laterality Date   • ABDOMINAL SURGERY     • COLON SURGERY     • COLONOSCOPY     • KIDNEY STONE SURGERY     • KIDNEY STONE SURGERY     • MOUTH SURGERY      2 teeth implants   • SEPTOPLASTY     • VASECTOMY     • VENTRAL/INCISIONAL HERNIA REPAIR N/A 12/8/2022    Procedure: VENTRAL/INCISIONAL HERNIA REPAIR;  Surgeon: Ellis Aguilera MD;  Location: [facility];  Service: General;  Laterality: N/A;       Current Outpatient Medications   Medication Instructions   • HYDROcodone-acetaminophen (NORCO) 7.5-325 MG per tablet 1 tablet, Oral, Every 6 Hours PRN   • rizatriptan (MAXALT) 10 MG tablet prn         Assessment & Plan   Diagnoses and all orders for this visit:    1. Status post repair of ventral hernia (Primary)    return prn

## 2024-05-24 ENCOUNTER — TRANSCRIBE ORDERS (OUTPATIENT)
Dept: ADMINISTRATIVE | Facility: HOSPITAL | Age: 59
End: 2024-05-24
Payer: COMMERCIAL

## 2024-05-24 DIAGNOSIS — N43.40 SPERMATOCELE OF EPIDIDYMIS, UNSPECIFIED: Primary | ICD-10-CM

## 2024-06-03 ENCOUNTER — HOSPITAL ENCOUNTER (OUTPATIENT)
Facility: HOSPITAL | Age: 59
Discharge: HOME | End: 2024-06-03
Admitting: PHYSICIAN ASSISTANT
Payer: COMMERCIAL

## 2024-06-03 DIAGNOSIS — N43.40 SPERMATOCELE OF EPIDIDYMIS, UNSPECIFIED: ICD-10-CM

## 2024-06-03 PROCEDURE — 76870 US EXAM SCROTUM: CPT

## 2024-06-03 PROCEDURE — 76870 US EXAM SCROTUM: CPT | Performed by: RADIOLOGY

## 2024-06-11 ENCOUNTER — OFFICE VISIT (OUTPATIENT)
Dept: UROLOGY | Facility: CLINIC | Age: 59
End: 2024-06-11
Payer: COMMERCIAL

## 2024-06-11 VITALS
WEIGHT: 182.2 LBS | BODY MASS INDEX: 25.51 KG/M2 | HEART RATE: 60 BPM | HEIGHT: 71 IN | SYSTOLIC BLOOD PRESSURE: 121 MMHG | DIASTOLIC BLOOD PRESSURE: 84 MMHG

## 2024-06-11 DIAGNOSIS — N50.3 EPIDIDYMAL CYST: Primary | ICD-10-CM

## 2024-06-11 DIAGNOSIS — I86.1 LEFT VARICOCELE: ICD-10-CM

## 2024-06-11 DIAGNOSIS — N20.0 NEPHROLITHIASIS: ICD-10-CM

## 2024-06-11 RX ORDER — UBROGEPANT 100 MG/1
100 TABLET ORAL AS NEEDED
COMMUNITY
Start: 2024-05-23

## 2024-06-11 NOTE — PROGRESS NOTES
"Chief Complaint:    Chief Complaint   Patient presents with    Nocturia    Testicle Pain    Kidney Stone     New patient       Vital Signs:   /84 (BP Location: Right arm, Patient Position: Sitting, Cuff Size: Adult)   Pulse 60   Ht 180.3 cm (70.98\")   Wt 82.6 kg (182 lb 3.2 oz)   BMI 25.42 kg/m²   Body mass index is 25.42 kg/m².      HPI:  Jhon Morocho is a 58 y.o. male who presents today for follow up    History of Present Illness  Mr. Morocho presents to the clinic for initial evaluation of nephrolithiasis, nocturia, and right-sided testicular/scrotal swelling.  He has been referred to us by Russ Benavides PA-C.  Patient has a past medical history significant for arthritis, benign tumor of the colon, GERD, and multiple kidney stones.  Patient states he has had kidney stones for roughly 20 years now and passes 1-2 yearly.  States his last kidney stone was in October 2023 for which she has passed voluntarily.  He does report undergoing 2 operative procedures several years ago for kidney stone removal.  He cannot recall the names of these procedures.  He has also had a vasectomy.  He did have a CT scan of the abdomen pelvis completed in 2018 that revealed a 2 mm lower pole left intrarenal calculi.  There were other punctate nonobstructing stones in the right kidney.  His most recent CT in October 2022 showed 3 to 4 mm lower pole left intrarenal stone with no other calculi identified.  He does also endorse right-sided scrotal/testicular swelling over the past 6 months.  He states he first noticed symptoms in January 2024.  He denies any pain with this.  He had a recent ultrasound completed by his referring provider that showed a large complex hydrocele versus epididymal cyst lateral to the right testicle.  He had good blood flow bilaterally to both testicles with no concerns of epididymitis/orchitis.  There was also a large varicocele noted.  He does endorse nocturia x 1.  He denies any frequency, urgency, " dysuria, split stream, difficulty urinating, pushing or straining begin with urination, or intermittency.  Testicle Pain  The patient's primary symptoms include testicular pain. Pertinent negatives include no abdominal pain, chest pain, constipation, diarrhea, dysuria, fever, frequency, nausea, rash, shortness of breath, urgency or vomiting.       Past Medical History:  Past Medical History:   Diagnosis Date    Arthritis     Benign tumor of colon     GERD (gastroesophageal reflux disease)     no problems for 6 years - diet change    Kidney stones        Current Meds:  Current Outpatient Medications   Medication Sig Dispense Refill    Ubrelvy 100 MG tablet Take 1 tablet by mouth As Needed.      HYDROcodone-acetaminophen (NORCO) 7.5-325 MG per tablet Take 1 tablet by mouth Every 6 (Six) Hours As Needed for Moderate Pain . (Patient not taking: Reported on 6/11/2024) 8 tablet 0    rizatriptan (MAXALT) 10 MG tablet prn (Patient not taking: Reported on 6/11/2024)       No current facility-administered medications for this visit.        Allergies:   Allergies   Allergen Reactions    Milk-Related Compounds GI Intolerance        Past Surgical History:  Past Surgical History:   Procedure Laterality Date    ABDOMINAL SURGERY      COLON SURGERY      COLONOSCOPY      KIDNEY STONE SURGERY      KIDNEY STONE SURGERY      MOUTH SURGERY      2 teeth implants    SEPTOPLASTY      VASECTOMY      VENTRAL/INCISIONAL HERNIA REPAIR N/A 12/8/2022    Procedure: VENTRAL/INCISIONAL HERNIA REPAIR;  Surgeon: Ellis Aguilera MD;  Location: [facility];  Service: General;  Laterality: N/A;       Social History:  Social History     Socioeconomic History    Marital status:    Tobacco Use    Smoking status: Never    Smokeless tobacco: Never   Vaping Use    Vaping status: Never Used   Substance and Sexual Activity    Alcohol use: Not Currently     Comment: social only    Drug use: Never    Sexual activity: Defer       Family History:  History  reviewed. No pertinent family history.    Review of Systems:  Review of Systems   Constitutional:  Positive for fatigue. Negative for fever and unexpected weight change.   Respiratory:  Negative for chest tightness and shortness of breath.    Cardiovascular:  Negative for chest pain.   Gastrointestinal:  Negative for abdominal pain, constipation, diarrhea, nausea and vomiting.   Genitourinary:  Positive for testicular pain. Negative for difficulty urinating, dysuria, frequency and urgency.   Skin:  Negative for rash.   Psychiatric/Behavioral:  Negative for confusion and suicidal ideas.        Physical Exam:  Physical Exam  Constitutional:       General: He is not in acute distress.     Appearance: Normal appearance.   HENT:      Head: Normocephalic and atraumatic.      Nose: Nose normal.      Mouth/Throat:      Mouth: Mucous membranes are moist.   Eyes:      Conjunctiva/sclera: Conjunctivae normal.   Cardiovascular:      Rate and Rhythm: Normal rate and regular rhythm.      Pulses: Normal pulses.      Heart sounds: Normal heart sounds.   Pulmonary:      Effort: Pulmonary effort is normal.      Breath sounds: Normal breath sounds.   Abdominal:      General: Bowel sounds are normal.      Palpations: Abdomen is soft.   Genitourinary:     Comments: Complex right epididymal cyst.  Normal right and left testicles.  Left grade 2 varicocele  Musculoskeletal:         General: Normal range of motion.      Cervical back: Normal range of motion.   Skin:     General: Skin is warm.   Neurological:      General: No focal deficit present.      Mental Status: He is alert and oriented to person, place, and time.   Psychiatric:         Mood and Affect: Mood normal.         Behavior: Behavior normal.         Thought Content: Thought content normal.         Judgment: Judgment normal.           Recent Image (CT and/or KUB):   CT Abdomen and Pelvis: No results found for this or any previous visit.     CT Stone Protocol: No results found  for this or any previous visit.     KUB: Results for orders placed in visit on 04/17/18    XR Abdomen KUB    Narrative  EXAMINATION: XR ABDOMEN KUB- 04/17/2018    INDICATION: N20.0-Calculus of kidney    COMPARISON: NONE    FINDINGS: Supine radiograph of the abdomen demonstrates a nonspecific  bowel gas pattern. There is no hepatosplenomegaly. There are no  pathological abdominal or pelvic calcifications.    Impression  Normal examination. There are no abdominal or pelvic  calcifications.    D:  04/17/2018  E:  04/17/2018    This report was finalized on 4/17/2018 4:10 PM by Dr. Jorge Lozano MD.       Labs:  Brief Urine Lab Results       None          No visits with results within 3 Month(s) from this visit.   Latest known visit with results is:   Hospital Outpatient Visit on 11/30/2022   Component Date Value Ref Range Status    Creatinine 11/30/2022 1.40 (H)  0.60 - 1.30 mg/dL Final    Serial Number: [id number]:  [id number]        Procedure:  Procedures none    Assessment/Plan:   Problem List Items Addressed This Visit       Epididymal cyst - Primary    Left varicocele    Nephrolithiasis       Health Maintenance:   Health Maintenance Due   Topic Date Due    BMI FOLLOWUP  Never done    COLORECTAL CANCER SCREENING  Never done    ZOSTER VACCINE (1 of 2) Never done    HEPATITIS C SCREENING  Never done    ANNUAL PHYSICAL  Never done    COVID-19 Vaccine (1 - 2023-24 season) Never done        Smoking Counseling: Never smoked.  Never used smokeless tobacco    Urine Incontinence: Patient reports that he is not currently experiencing any symptoms of urinary incontinence.    Patient was given instructions and counseling regarding his condition or for health maintenance advice. Please see specific information pulled into the AVS if appropriate.    Patient Education:   Epididymal cyst -I discussed with the patient the pathophysiology of an epididymal cyst.  I discussed the course of treatment with conservative methods versus  surgery.  At this time I am recommending conservative treatment methods.  Advised patient that if he undergoes surgery for cyst removal that this can lead to infertility.  I advised patient to use warm soaks and compresses twice daily for 10 to 15 minutes.  Advised him to alternate ibuprofen and Tylenol as needed every 4-6 hours.  Did advise patient if he begins to experience an increase in pain, swelling, or any concerns of infection to return to the clinic as soon as possible.  Did discuss the use of surgical intervention with a spermatocelectomy including risk and benefits.  Also discussed the pathophysiology of hydroceles.  Discussed this in detail with the patient including surgical intervention such as hydrocelectomy versus needle aspirations.  At this time recommending continue with observation advised him return to clinic sooner if needed.  He verbalized understanding.  Left varicocele -discussed with the patient the pathophysiology of a varicocele.  Discussed the grading of varicocele including a 1-3.  Discussed the forms of treatment which can include observation, conservative methods, or surgical interventions.  Given patient is having no symptomology at this time and recommended to continue with conservative treatment methods.  Advised patient to use good scrotal support and elevation with supportive jockstrap.  Educated patient heavy lifting can worsen varicoceles.  He verbalized understanding.  Nephrolithiasis -discussed with the patient the pathophysiology of nephrolithiasis.  I discussed causes which can include but not limited to dehydration, family or history, soda, sweet or unsweet dark tea, nuts, or excessive dairy products.  Discussed an appropriate workup including an x-ray versus CT scan abdomen pelvis.  Given the patient is asymptomatic at this time he does not wish to undergo any imaging which is reasonable.  Did discuss forms of surgical intervention which can include ureteroscopy with  holmium laser lithotripsy, extracorporal shockwave lithotripsy, or percutaneous nephrolithotomy.  Discussed the indications for each of these procedures in detail including risk and benefits.  Otherwise we will see the patient back on an as-needed basis per his request.    Visit Diagnoses:    ICD-10-CM ICD-9-CM   1. Epididymal cyst  N50.3 608.89   2. Left varicocele  I86.1 456.4   3. Nephrolithiasis  N20.0 592.0     A total of 45 minutes were spent coordinating this patient’s care in clinic today; 30 minutes of which were face-to-face with the patient, reviewing medical history and counseling on the current treatment and followup plan.  All questions were answered to patient's satisfaction.    Meds Ordered During Visit:  No orders of the defined types were placed in this encounter.      Follow Up Appointment: As needed per his request  No follow-ups on file.      This document has been electronically signed by Los Nair PA-C   June 11, 2024 11:52 EDT    Part of this note may be an electronic transcription/translation of spoken language to printed text using the Dragon Dictation System.

## 2024-06-25 ENCOUNTER — OFFICE VISIT (OUTPATIENT)
Dept: FAMILY MEDICINE CLINIC | Facility: CLINIC | Age: 59
End: 2024-06-25
Payer: COMMERCIAL

## 2024-06-25 VITALS
HEART RATE: 85 BPM | TEMPERATURE: 97.6 F | SYSTOLIC BLOOD PRESSURE: 126 MMHG | WEIGHT: 179.6 LBS | OXYGEN SATURATION: 97 % | DIASTOLIC BLOOD PRESSURE: 82 MMHG | HEIGHT: 71 IN | BODY MASS INDEX: 25.15 KG/M2

## 2024-06-25 DIAGNOSIS — Z12.11 COLON CANCER SCREENING: ICD-10-CM

## 2024-06-25 DIAGNOSIS — R14.0 POSTPRANDIAL ABDOMINAL BLOATING: ICD-10-CM

## 2024-06-25 DIAGNOSIS — Z76.89 ENCOUNTER TO ESTABLISH CARE: Primary | ICD-10-CM

## 2024-06-25 DIAGNOSIS — G43.909 MIGRAINE WITHOUT STATUS MIGRAINOSUS, NOT INTRACTABLE, UNSPECIFIED MIGRAINE TYPE: ICD-10-CM

## 2024-06-25 PROCEDURE — 99204 OFFICE O/P NEW MOD 45 MIN: CPT | Performed by: FAMILY MEDICINE

## 2024-06-25 NOTE — PROGRESS NOTES
Subjective   Jhon Morocho is a 58 y.o. male.   Pt presents today with CC of Establish Care and GI Problem      History of Present Illness   History of Present Illness  Patient is a generally healthy 58-year-old male here to establish care.  He is retired.  He stays physically active.  His only concern is abdominal bloating after eating.     The following portions of the patient's history were reviewed and updated as appropriate: allergies, current medications, past family history, past medical history, past social history, past surgical history, and problem list.    Review of Systems   Constitutional:  Negative for chills, fever and unexpected weight loss.   HENT:  Negative for congestion and sore throat.    Eyes:  Negative for blurred vision and visual disturbance.   Respiratory:  Negative for cough and wheezing.    Cardiovascular:  Negative for chest pain and palpitations.   Gastrointestinal:  Negative for abdominal pain and diarrhea.   Endocrine: Negative for cold intolerance and heat intolerance.   Genitourinary:  Negative for dysuria.   Musculoskeletal:  Negative for arthralgias and neck stiffness.   Neurological:  Negative for dizziness, seizures and syncope.   Psychiatric/Behavioral:  Negative for self-injury, suicidal ideas and depressed mood.      Vitals:    06/25/24 0838   BP: 126/82   Pulse: 85   Temp: 97.6 °F (36.4 °C)   SpO2: 97%        Objective   Physical Exam  Vitals and nursing note reviewed.   Constitutional:       Appearance: He is well-developed.   HENT:      Head: Normocephalic and atraumatic.      Right Ear: External ear normal.      Left Ear: External ear normal.      Nose: Nose normal.   Eyes:      Conjunctiva/sclera: Conjunctivae normal.      Pupils: Pupils are equal, round, and reactive to light.   Cardiovascular:      Rate and Rhythm: Normal rate and regular rhythm.      Heart sounds: Normal heart sounds.   Pulmonary:      Effort: Pulmonary effort is normal.      Breath sounds: Normal  breath sounds.   Abdominal:      General: Bowel sounds are normal.      Palpations: Abdomen is soft.   Musculoskeletal:      Cervical back: Normal range of motion and neck supple.   Skin:     General: Skin is warm and dry.   Neurological:      Mental Status: He is alert and oriented to person, place, and time.   Psychiatric:         Behavior: Behavior normal.         Assessment & Plan   Diagnoses and all orders for this visit:    1. Encounter to establish care (Primary)  58-year-old male, retired.  Not , no children.  Lives alone.  He gets regular exercise and stays physically active.  His only health concern is postprandial abdominal bloating that happens with each large meal that he ever eats for the last 20 years, per report.  He states that he has had evaluation for this with no benefit or diagnosis.  He has been to a few different family doctors over the last couple of years without any help.  Per report, he has seen gastroenterology in the past.  He had a tumor taken off of his colon in the past.  No recurrence reported.  Reportedly he is not due for colon cancer screening.  Records pending  2. Postprandial abdominal bloating  -     Enteric Bacterial Panel - Stool, Per Rectum; Future  -     Fecal Fat, Qualitative - Stool, Per Rectum; Future  -     Fecal Leukocytes - Stool, Per Rectum; Future  -     Giardia / Cryptosporidium Screen - Stool, Per Rectum; Future  -     H. Pylori Antigen, Stool - Stool, Per Rectum; Future  -     Stool Culture (Reference Lab) - Stool, Per Rectum; Future  -     Ova & Parasite Examination - Stool, Per Rectum; Future  Because of persistent symptoms, I recommend stool studies.  He had blood work 2 weeks ago.  He is going to get me records of this so that I can evaluate.  3. Migraine without status migrainosus, not intractable, unspecified migraine type  He does not need refill on Ubrelvy.  Follow-up as needed, follow-up in 1 year if not sooner.  4. Colon cancer screening  He  reports that he is up-to-date.  Records requested.  Many years ago he had a tumor removed from his colon, per report.  Per report he has also maintained his colon cancer screening.                 This document has been electronically signed by Andrea Ferreira DO  June 25, 2024 09:35 EDT    Dictated Utilizing Dragon Dictation: Part of this note may be an electronic transcription/translation of spoken language to printed text using the Dragon Dictation System.

## 2024-06-27 ENCOUNTER — PATIENT ROUNDING (BHMG ONLY) (OUTPATIENT)
Dept: FAMILY MEDICINE CLINIC | Facility: CLINIC | Age: 59
End: 2024-06-27
Payer: COMMERCIAL

## 2024-06-27 NOTE — PROGRESS NOTES
June 27, 2024    Hello, may I speak with Jhon Morocho?    My name is  Areli     I am  with [facility]  [facility]  [address] [place] [address] [phone].    Before we get started may I verify your date of birth? 1965 yes    I am calling to officially welcome you to our practice and ask about your recent visit. Is this a good time to talk? yes    Tell me about your visit with us. What things went well?  the visit went good,no concern's at this time       We're always looking for ways to make our patients' experiences even better. Do you have recommendations on ways we may improve?  No   Overall were you satisfied with your first visit to our practice? yes       I appreciate you taking the time to speak with me today. Is there anything else I can do for you?   No     Thank you, and have a great day.

## 2024-06-28 ENCOUNTER — LAB (OUTPATIENT)
Dept: LAB | Facility: HOSPITAL | Age: 59
End: 2024-06-28
Payer: COMMERCIAL

## 2024-06-28 DIAGNOSIS — R14.0 POSTPRANDIAL ABDOMINAL BLOATING: ICD-10-CM

## 2024-06-28 LAB

## 2024-06-28 PROCEDURE — 89125 SPECIMEN FAT STAIN: CPT

## 2024-06-28 PROCEDURE — 87209 SMEAR COMPLEX STAIN: CPT

## 2024-06-28 PROCEDURE — 87177 OVA AND PARASITES SMEARS: CPT

## 2024-06-28 PROCEDURE — 87338 HPYLORI STOOL AG IA: CPT

## 2024-06-28 PROCEDURE — 87205 SMEAR GRAM STAIN: CPT

## 2024-06-28 PROCEDURE — 87427 SHIGA-LIKE TOXIN AG IA: CPT

## 2024-06-28 PROCEDURE — 87046 STOOL CULTR AEROBIC BACT EA: CPT

## 2024-06-28 PROCEDURE — 87045 FECES CULTURE AEROBIC BACT: CPT

## 2024-06-28 PROCEDURE — 87507 IADNA-DNA/RNA PROBE TQ 12-25: CPT

## 2024-06-29 LAB
FATTY ACIDS: NORMAL
NEUTRAL FATS: NORMAL

## 2024-06-30 LAB
BACTERIA SPEC CULT: NORMAL
E COLI SXT STL QL IA: NEGATIVE
H PYLORI AG STL QL IA: POSITIVE
SALM + SHIG STL CULT: NORMAL

## 2024-07-01 ENCOUNTER — TELEPHONE (OUTPATIENT)
Dept: FAMILY MEDICINE CLINIC | Facility: CLINIC | Age: 59
End: 2024-07-01
Payer: COMMERCIAL

## 2024-07-01 DIAGNOSIS — K29.80 H. PYLORI DUODENITIS: Primary | ICD-10-CM

## 2024-07-01 DIAGNOSIS — B96.81 H. PYLORI DUODENITIS: Primary | ICD-10-CM

## 2024-07-01 RX ORDER — TETRACYCLINE HYDROCHLORIDE 500 MG/1
500 CAPSULE ORAL 4 TIMES DAILY
Qty: 40 CAPSULE | Refills: 0 | Status: SHIPPED | OUTPATIENT
Start: 2024-07-01

## 2024-07-01 RX ORDER — PANTOPRAZOLE SODIUM 40 MG/1
40 TABLET, DELAYED RELEASE ORAL EVERY 12 HOURS
Qty: 28 TABLET | Refills: 0 | Status: SHIPPED | OUTPATIENT
Start: 2024-07-01

## 2024-07-01 RX ORDER — METRONIDAZOLE 250 MG/1
250 TABLET ORAL 4 TIMES DAILY
Qty: 40 TABLET | Refills: 0 | Status: SHIPPED | OUTPATIENT
Start: 2024-07-01

## 2024-07-01 NOTE — TELEPHONE ENCOUNTER
----- Message from Andrea Ferreira sent at 7/1/2024  8:23 AM EDT -----  I reviewed your blood work.  You are positive for H. pylori.  This is a bacteria that grows in the stomach, and it likely explains your postmeal bloating.  There are 2 reasonable options for treatment.  Triple therapy has a medication called clarithromycin that interacts strongly with Ubrelvy, and quadruple therapy, which I have chosen, does not interact in such a way.  If you would like to discuss, please follow-up with me or someone in the office in the next week or so.  Otherwise, we can start the treatment and check on you at your follow-up appointment here in a couple weeks.

## 2024-07-01 NOTE — TELEPHONE ENCOUNTER
Patient notified and voiced understanding.   He is going to  the medication and if he has further questions he will call back to schedule if needed

## 2024-07-02 LAB
BACTERIA SPEC CULT: NORMAL
BACTERIA SPEC CULT: NORMAL
CAMPYLOBACTER STL CULT: NORMAL
E COLI SXT STL QL IA: NEGATIVE
SALM + SHIG STL CULT: NORMAL

## 2024-07-03 LAB
O+P SPEC MICRO: NORMAL
O+P STL CONC: NORMAL
WBC STL QL MICRO: NORMAL
WBC STL QL MICRO: NORMAL

## 2024-07-05 ENCOUNTER — TELEPHONE (OUTPATIENT)
Dept: FAMILY MEDICINE CLINIC | Facility: CLINIC | Age: 59
End: 2024-07-05
Payer: COMMERCIAL

## 2024-07-05 DIAGNOSIS — B96.81 H. PYLORI DUODENITIS: Primary | ICD-10-CM

## 2024-07-05 DIAGNOSIS — K29.80 H. PYLORI DUODENITIS: Primary | ICD-10-CM

## 2024-07-05 RX ORDER — ONDANSETRON 4 MG/1
4 TABLET, ORALLY DISINTEGRATING ORAL EVERY 8 HOURS PRN
Qty: 30 TABLET | Refills: 0 | Status: SHIPPED | OUTPATIENT
Start: 2024-07-05

## 2024-07-05 NOTE — TELEPHONE ENCOUNTER
Caller: Jhon Morocho    Relationship: Self    Best call back number: [phone]    Which medication are you concerned about:     MEDICATIONS FOR STOMACH ISSUE    What are your concerns:     MEDICATIONS MAKING HIM NAUSEOUS

## 2024-07-12 DIAGNOSIS — B96.81 H. PYLORI DUODENITIS: ICD-10-CM

## 2024-07-12 DIAGNOSIS — K29.80 H. PYLORI DUODENITIS: ICD-10-CM

## 2024-07-12 RX ORDER — PANTOPRAZOLE SODIUM 40 MG/1
40 TABLET, DELAYED RELEASE ORAL EVERY 12 HOURS
Qty: 28 TABLET | Refills: 0 | OUTPATIENT
Start: 2024-07-12

## 2024-07-24 ENCOUNTER — OFFICE VISIT (OUTPATIENT)
Dept: FAMILY MEDICINE CLINIC | Facility: CLINIC | Age: 59
End: 2024-07-24
Payer: COMMERCIAL

## 2024-07-24 VITALS
OXYGEN SATURATION: 99 % | WEIGHT: 171.2 LBS | SYSTOLIC BLOOD PRESSURE: 122 MMHG | HEIGHT: 71 IN | DIASTOLIC BLOOD PRESSURE: 76 MMHG | TEMPERATURE: 97.8 F | HEART RATE: 96 BPM | BODY MASS INDEX: 23.97 KG/M2

## 2024-07-24 DIAGNOSIS — Z00.00 ANNUAL PHYSICAL EXAM: Primary | ICD-10-CM

## 2024-07-24 DIAGNOSIS — R14.0 POSTPRANDIAL ABDOMINAL BLOATING: ICD-10-CM

## 2024-07-24 DIAGNOSIS — K29.80 H. PYLORI DUODENITIS: ICD-10-CM

## 2024-07-24 DIAGNOSIS — B96.81 H. PYLORI DUODENITIS: ICD-10-CM

## 2024-07-24 DIAGNOSIS — Z82.49 FH: CAD (CORONARY ARTERY DISEASE): ICD-10-CM

## 2024-07-24 PROCEDURE — 99396 PREV VISIT EST AGE 40-64: CPT | Performed by: FAMILY MEDICINE

## 2024-07-24 NOTE — PROGRESS NOTES
Subjective   Jhon Morocho is a 58 y.o. male.   Pt presents today with CC of Abdominal Pain (Follow up)      History of Present Illness   History of Present Illness  Patient is a 58-year-old male here to follow-up on his abdominal pain.  He was treated for H. pylori and is doing better.  He completed treatment.  He is no longer taking these medications.  #2 he is here today for annual physical exam.  He would prefer not to have blood work as he had blood work at his prior PCP office a couple months ago, reported as normal.       The following portions of the patient's history were reviewed and updated as appropriate: allergies, current medications, past family history, past medical history, past social history, past surgical history, and problem list.    Review of Systems   Constitutional:  Negative for chills, fever and unexpected weight loss.   HENT:  Negative for congestion and sore throat.    Eyes:  Negative for blurred vision and visual disturbance.   Respiratory:  Negative for cough and wheezing.    Cardiovascular:  Negative for chest pain and palpitations.   Gastrointestinal:  Negative for abdominal pain and diarrhea.   Endocrine: Negative for cold intolerance and heat intolerance.   Genitourinary:  Negative for dysuria.   Musculoskeletal:  Negative for arthralgias and neck stiffness.   Neurological:  Negative for dizziness, seizures and syncope.   Psychiatric/Behavioral:  Negative for self-injury, suicidal ideas and depressed mood.      Vitals:    07/24/24 0828   BP: 122/76   Pulse: 96   Temp: 97.8 °F (36.6 °C)   SpO2: 99%        Objective   Physical Exam  Vitals and nursing note reviewed.   Constitutional:       Appearance: He is well-developed.   HENT:      Head: Normocephalic and atraumatic.      Right Ear: External ear normal.      Left Ear: External ear normal.      Nose: Nose normal.   Eyes:      Conjunctiva/sclera: Conjunctivae normal.      Pupils: Pupils are equal, round, and reactive to light.    Cardiovascular:      Rate and Rhythm: Normal rate and regular rhythm.      Heart sounds: Normal heart sounds.   Pulmonary:      Effort: Pulmonary effort is normal.      Breath sounds: Normal breath sounds.   Abdominal:      General: Bowel sounds are normal.      Palpations: Abdomen is soft.   Musculoskeletal:      Cervical back: Normal range of motion and neck supple.   Skin:     General: Skin is warm and dry.   Neurological:      Mental Status: He is alert and oriented to person, place, and time.   Psychiatric:         Behavior: Behavior normal.           Assessment & Plan   Diagnoses and all orders for this visit:    1. Annual physical exam (Primary)  Patient Counseling:  --Nutrition: Stressed importance of moderation in sodium/caffeine intake, saturated fat and cholesterol, caloric balance, sufficient intake of fresh fruits, vegetables, fiber, calcium, iron, and 1 mg of folate supplement per day (for females capable of pregnancy).  --Exercise: Stressed the importance of regular exercise.   --Substance Abuse: Discussed cessation/primary prevention of tobacco, alcohol, or other drug use; driving or other dangerous activities under the influence; availability of treatment for abuse.    --Injury prevention: Discussed safety belts, safety helmets, smoke detector, smoking near bedding or upholstery.   --Dental health: Discussed importance of regular tooth brushing, flossing, and dental visits.  --After hours service discussed with patient    He is currently in the last few hours of a 72-hour fast.  He does this for Buddhism reasons about annually.  This is the reason for his weight loss.    2. FH: CAD (coronary artery disease)  He reports that he had a stress test in the past that was reported as normal.  He is not currently having any issues.  We discussed that he does have an increased risk of myocardial event because of his past medical history, but otherwise he is in good health and does not smoke.  He is going  to return to clinic if he has any concerns.  3. Postprandial abdominal bloating  Resolved, he had H. pylori.  He underwent treatment and he is doing better.  4. H. pylori duodenitis                    BMI is within normal parameters. No other follow-up for BMI required.          This document has been electronically signed by Opal Danielle  July 24, 2024 08:31 EDT    Dictated Utilizing Dragon Dictation: Part of this note may be an electronic transcription/translation of spoken language to printed text using the Dragon Dictation System.

## 2025-01-02 DIAGNOSIS — G43.909 MIGRAINE WITHOUT STATUS MIGRAINOSUS, NOT INTRACTABLE, UNSPECIFIED MIGRAINE TYPE: Primary | ICD-10-CM

## 2025-01-02 RX ORDER — UBROGEPANT 100 MG/1
100 TABLET ORAL AS NEEDED
Qty: 30 TABLET | Refills: 0 | Status: SHIPPED | OUTPATIENT
Start: 2025-01-02

## 2025-01-02 RX ORDER — UBROGEPANT 100 MG/1
100 TABLET ORAL AS NEEDED
Qty: 30 TABLET | Status: CANCELLED | OUTPATIENT
Start: 2025-01-02

## 2025-01-02 NOTE — TELEPHONE ENCOUNTER
Caller: Jhon Morocho    Relationship: Self    Best call back number: [phone]     Requested Prescriptions:   Requested Prescriptions     Pending Prescriptions Disp Refills    Ubrelvy 100 MG tablet 30 tablet      Sig: Take 1 tablet by mouth As Needed.        Pharmacy where request should be sent: [id number] - [place] - [address] AT [facility] [phone] [facility] [phone]      Last office visit with prescribing clinician: 7/24/2024   Last telemedicine visit with prescribing clinician: Visit date not found   Next office visit with prescribing clinician: Visit date not found     Additional details provided by patient: JUST A COUPLE LEFT    Does the patient have less than a 3 day supply:  [x] Yes  [] No    Would you like a call back once the refill request has been completed: [] Yes [x] No    If the office needs to give you a call back, can they leave a voicemail: [] Yes [x] No    Myranda Roman   01/02/25 13:19 EST

## 2025-01-30 ENCOUNTER — TELEPHONE (OUTPATIENT)
Dept: FAMILY MEDICINE CLINIC | Facility: CLINIC | Age: 60
End: 2025-01-30
Payer: COMMERCIAL

## 2025-01-30 NOTE — TELEPHONE ENCOUNTER
01/30/2025 PA for Ubrelvy 100MG tablets submitted and approved from 12/31/2024 to 01/30/2026. Patient notified.

## (undated) DEVICE — HOLDER: Brand: DEROYAL

## (undated) DEVICE — PATIENT RETURN ELECTRODE, SINGLE-USE, CONTACT QUALITY MONITORING, ADULT, WITH 9FT CORD, FOR PATIENTS WEIGING OVER 33LBS. (15KG): Brand: MEGADYNE

## (undated) DEVICE — ANTIBACTERIAL UNDYED BRAIDED (POLYGLACTIN 910), SYNTHETIC ABSORBABLE SUTURE: Brand: COATED VICRYL

## (undated) DEVICE — TBG PENCL TELESCP MEGADYNE SMOKE EVAC 10FT

## (undated) DEVICE — GLV SURG PREMIERPRO MIC LTX PF SZ7.5 BRN

## (undated) DEVICE — PROXIMATE RH ROTATING HEAD SKIN STAPLERS (35 WIDE) CONTAINS 35 STAINLESS STEEL STAPLES: Brand: PROXIMATE

## (undated) DEVICE — SUT PDS 1 TP1 48IN Z880G BX/12

## (undated) DEVICE — APPL CHLORAPREP HI/LITE 26ML ORNG

## (undated) DEVICE — SUT VIC 2/0 UR6 27IN VCP602H

## (undated) DEVICE — PK BASIC 70

## (undated) DEVICE — DRSNG WND BORDR/ADHS NONADHR/GZ LF 4X10IN STRL

## (undated) DEVICE — DBD-DRAPE,LAP,CHOLE,W/TROUGHS,STERILE: Brand: MEDLINE

## (undated) DEVICE — DRAPE,UTILTY,TAPE,15X26, 4EA/PK: Brand: MEDLINE